# Patient Record
Sex: MALE | Race: WHITE | NOT HISPANIC OR LATINO | URBAN - METROPOLITAN AREA
[De-identification: names, ages, dates, MRNs, and addresses within clinical notes are randomized per-mention and may not be internally consistent; named-entity substitution may affect disease eponyms.]

---
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
## 2024-02-21 PROBLEM — H61.20 CERUMEN IMPACTION: Status: RESOLVED | Noted: 2019-12-19 | Resolved: 2024-02-21

## 2024-05-09 ENCOUNTER — OFFICE VISIT (OUTPATIENT)
Dept: FAMILY MEDICINE CLINIC | Facility: CLINIC | Age: 19
End: 2024-05-09
Payer: COMMERCIAL

## 2024-05-09 VITALS
TEMPERATURE: 98.6 F | HEIGHT: 70 IN | BODY MASS INDEX: 31.55 KG/M2 | HEART RATE: 76 BPM | OXYGEN SATURATION: 97 % | WEIGHT: 220.4 LBS | SYSTOLIC BLOOD PRESSURE: 122 MMHG | DIASTOLIC BLOOD PRESSURE: 98 MMHG

## 2024-05-09 DIAGNOSIS — Z00.00 ROUTINE HEALTH MAINTENANCE: ICD-10-CM

## 2024-05-09 DIAGNOSIS — L81.9 PIGMENTED SKIN LESION: Primary | ICD-10-CM

## 2024-05-09 LAB
ALBUMIN SERPL-MCNC: 4.8 G/DL (ref 3.5–5.7)
ALP SERPL-CCNC: 48 U/L (ref 35–120)
ALT SERPL-CCNC: 128 U/L
ANION GAP SERPL CALCULATED.3IONS-SCNC: 10 MMOL/L (ref 3–11)
AST SERPL-CCNC: 50 U/L
BASOPHILS # BLD AUTO: 0 THOU/CMM (ref 0–0.1)
BASOPHILS NFR BLD AUTO: 1 %
BILIRUB SERPL-MCNC: 0.7 MG/DL (ref 0.2–1)
BUN SERPL-MCNC: 13 MG/DL (ref 7–28)
CALCIUM SERPL-MCNC: 9.9 MG/DL (ref 8.5–10.1)
CHLORIDE SERPL-SCNC: 102 MMOL/L (ref 100–109)
CHOLEST SERPL-MCNC: 166 MG/DL
CHOLEST/HDLC SERPL: 3.4 {RATIO}
CO2 SERPL-SCNC: 27 MMOL/L (ref 21–31)
CREAT SERPL-MCNC: 0.86 MG/DL (ref 0.53–1.3)
CYTOLOGY CMNT CVX/VAG CYTO-IMP: ABNORMAL
DIFFERENTIAL METHOD BLD: NORMAL
EOSINOPHIL # BLD AUTO: 0.3 THOU/CMM (ref 0–0.5)
EOSINOPHIL NFR BLD AUTO: 5 %
ERYTHROCYTE [DISTWIDTH] IN BLOOD BY AUTOMATED COUNT: 12.4 % (ref 12–16)
GFR/BSA.PRED SERPLBLD CYS-BASED-ARV: 127 ML/MIN/{1.73_M2}
GLUCOSE SERPL-MCNC: 85 MG/DL (ref 65–99)
HCT VFR BLD AUTO: 44 % (ref 37–48)
HDLC SERPL-MCNC: 49 MG/DL (ref 23–92)
HGB BLD-MCNC: 15.9 G/DL (ref 12.5–17)
LDLC SERPL CALC-MCNC: 94 MG/DL
LYMPHOCYTES # BLD AUTO: 2.5 THOU/CMM (ref 1–3)
LYMPHOCYTES NFR BLD AUTO: 38 %
MCH RBC QN AUTO: 30.7 PG (ref 27–36)
MCHC RBC AUTO-ENTMCNC: 36 G/DL (ref 32–37)
MCV RBC AUTO: 85 FL (ref 80–100)
MONOCYTES # BLD AUTO: 0.5 THOU/CMM (ref 0.3–1)
MONOCYTES NFR BLD AUTO: 7 %
NEUTROPHILS # BLD AUTO: 3.2 THOU/CMM (ref 1.8–7.8)
NEUTROPHILS NFR BLD AUTO: 49 %
NONHDLC SERPL-MCNC: 117 MG/DL
PLATELET # BLD AUTO: 263 THOU/CMM (ref 140–350)
PMV BLD REES-ECKER: 8 FL (ref 7.5–11.3)
POTASSIUM SERPL-SCNC: 4.4 MMOL/L (ref 3.5–5.2)
PROT SERPL-MCNC: 7.4 G/DL (ref 6.3–8.3)
RBC # BLD AUTO: 5.17 MILL/CMM (ref 4–5.4)
SODIUM SERPL-SCNC: 139 MMOL/L (ref 135–145)
TRIGL SERPL-MCNC: 114 MG/DL
TSH SERPL-ACNC: 0.99 UIU/ML (ref 0.45–5.33)
WBC # BLD AUTO: 6.5 THOU/CMM (ref 4–10.5)

## 2024-05-09 PROCEDURE — 99213 OFFICE O/P EST LOW 20 MIN: CPT | Performed by: FAMILY MEDICINE

## 2024-05-09 PROCEDURE — 99395 PREV VISIT EST AGE 18-39: CPT | Performed by: FAMILY MEDICINE

## 2024-05-13 NOTE — PROGRESS NOTES
Assessment/Plan:    20 y/o male with: pigmented skin lesion and annual well visit. Will refer to Derm. Discussed supportive care and return parameters.     Regarding Annual well visit. Discussed various safety and health maintenance issues including healthy diet like the Mediterranean diet, exercise, ample sleep, stress reduction, and healthy weight as tolerated. Discussed supportive care and return parameters.     No problem-specific Assessment & Plan notes found for this encounter.       Diagnoses and all orders for this visit:    Pigmented skin lesion  -     Ambulatory Referral to Dermatology; Future  -     CBC and differential; Future  -     Comprehensive metabolic panel; Future  -     TSH, 3rd generation with Free T4 reflex; Future  -     Lipid Panel with Direct LDL reflex; Future    Routine health maintenance  -     CBC and differential; Future  -     Comprehensive metabolic panel; Future  -     TSH, 3rd generation with Free T4 reflex; Future  -     Lipid Panel with Direct LDL reflex; Future          Subjective:     Chief Complaint   Patient presents with    Well Check     Annual physical, patient is complaining of spot by the left ear which has been there for a few month. Patient would also like to have his lungs checked for fluid. No further concerns, ng        Patient ID: Dom Monzon is a 19 y.o. male.    Pt is a 20 y/o male who presents for follow-up on changing skin lesion left face no fevers chills nausea or vomiting. Pt also here for annual well visit admits being active eats and sleeps well.        The following portions of the patient's history were reviewed and updated as appropriate: allergies, current medications, past family history, past medical history, past social history, past surgical history and problem list.    Review of Systems   Constitutional: Negative.    HENT: Negative.     Eyes: Negative.    Respiratory: Negative.     Cardiovascular: Negative.    Gastrointestinal: Negative.   "  Endocrine: Negative.    Genitourinary: Negative.    Musculoskeletal: Negative.    Allergic/Immunologic: Negative.    Neurological: Negative.    Hematological: Negative.    Psychiatric/Behavioral: Negative.     All other systems reviewed and are negative.        Objective:      /98 (BP Location: Left arm, Patient Position: Sitting, Cuff Size: Large)   Pulse 76   Temp 98.6 °F (37 °C) (Tympanic)   Ht 5' 10\" (1.778 m)   Wt 100 kg (220 lb 6.4 oz)   SpO2 97%   BMI 31.62 kg/m²          Physical Exam  Constitutional:       Appearance: He is well-developed.   HENT:      Head: Atraumatic.      Right Ear: External ear normal.      Left Ear: External ear normal.   Eyes:      Conjunctiva/sclera: Conjunctivae normal.      Pupils: Pupils are equal, round, and reactive to light.   Cardiovascular:      Rate and Rhythm: Normal rate and regular rhythm.      Heart sounds: Normal heart sounds.   Pulmonary:      Effort: Pulmonary effort is normal. No respiratory distress.      Breath sounds: Normal breath sounds.   Abdominal:      General: Bowel sounds are normal. There is no distension.      Palpations: Abdomen is soft.      Tenderness: There is no abdominal tenderness. There is no guarding or rebound.   Musculoskeletal:         General: Normal range of motion.      Cervical back: Normal range of motion.   Skin:     General: Skin is warm and dry.      Comments: Changing pigmented skin lesion   Neurological:      Mental Status: He is alert and oriented to person, place, and time.      Cranial Nerves: No cranial nerve deficit.   Psychiatric:         Behavior: Behavior normal.         Thought Content: Thought content normal.         Judgment: Judgment normal.         "

## 2024-05-28 ENCOUNTER — TELEMEDICINE (OUTPATIENT)
Dept: FAMILY MEDICINE CLINIC | Facility: CLINIC | Age: 19
End: 2024-05-28
Payer: COMMERCIAL

## 2024-05-28 DIAGNOSIS — R74.01 TRANSAMINITIS: Primary | ICD-10-CM

## 2024-05-28 PROCEDURE — G2012 BRIEF CHECK IN BY MD/QHP: HCPCS | Performed by: FAMILY MEDICINE

## 2024-05-30 NOTE — PROGRESS NOTES
Virtual Brief Visit    This Visit is being completed by telephone. The Patient is located at Home and in the following state in which I hold an active license [place]    The patient was identified by name and date of birth. Dom Monzon was informed that this is a telemedicine visit and that the visit is being conducted through the Epic Embedded platform. He agrees to proceed..  My office door was closed. No one else was in the room.  He acknowledged consent and understanding of privacy and security of the video platform. The patient has agreed to participate and understands they can discontinue the visit at any time.    Patient is aware this is a billable service.     Assessment/Plan:    Problem List Items Addressed This Visit    None  Visit Diagnoses       Transaminitis    -  Primary    Relevant Orders    Ambulatory Referral to Gastroenterology          18 y/o male with: transaminiitis. Will refer to GI for further workup. Discussed supportive care and return parameters.     Recent Visits  Date Type Provider Dept   05/28/24 Telemedicine Andres Smith MD [facility]   Showing recent visits within past 7 days and meeting all other requirements  Future Appointments  No visits were found meeting these conditions.  Showing future appointments within next 150 days and meeting all other requirements     Patient is a 18 y/o woman who presents for follow-up on recent labs which show transaminitis no fevers chills nausea or vomiting.

## 2024-06-08 PROBLEM — Z00.00 ROUTINE HEALTH MAINTENANCE: Status: RESOLVED | Noted: 2020-12-15 | Resolved: 2024-06-08

## 2024-07-23 ENCOUNTER — OFFICE VISIT (OUTPATIENT)
Dept: GASTROENTEROLOGY | Facility: MEDICAL CENTER | Age: 19
End: 2024-07-23
Payer: COMMERCIAL

## 2024-07-23 VITALS
SYSTOLIC BLOOD PRESSURE: 141 MMHG | BODY MASS INDEX: 32.89 KG/M2 | WEIGHT: 229.2 LBS | TEMPERATURE: 98.1 F | DIASTOLIC BLOOD PRESSURE: 82 MMHG | HEART RATE: 76 BPM

## 2024-07-23 DIAGNOSIS — R74.01 TRANSAMINITIS: ICD-10-CM

## 2024-07-23 PROCEDURE — 99203 OFFICE O/P NEW LOW 30 MIN: CPT | Performed by: NURSE PRACTITIONER

## 2024-07-23 NOTE — PROGRESS NOTES
[facility] - Outpatient Consultation  Dom Monzon 19 y.o. male MRN: [id number]  Encounter: [id number]          ASSESSMENT AND PLAN:    Dom Monzon is a 19 y.o. male who presents with complaint of     1. Transaminitis    Was told approximately 2 years ago that he had elevated LFTs.  He was to have a full workup but never had it done.  Here today for follow-up.  Recent labs as noted below.  Has not had any imaging of his abdomen.  Denies any herbal supplements.  No risk factors for viral hepatitis.  Denies any family history of any liver disease.  He does smoke marijuana daily.  Reports occasional alcohol use.  Over the past 2 years his weight has fluctuated up and down approximately 10 to 15 pounds.  Denies any jaundice, abdominal pain, melena or hematochezia.  Will obtain a full liver serology workup and an ultrasound of the abdomen.    -Ultrasound of the abdomen  -CLAIR, AMA, ASMA, ceruloplasmin, alpha 1 antitrypsin, iron studies, celiac panel, hepatitis A, B and C serologies, LFTs, direct bilirubin, hemoglobin A1c  -Follow-up in office in 3 months or sooner if needed       ______________________________________________________________________    HPI:    Dom Monzon is a 19 y.o. male who presents with complaint of .  He has a past medical history of acne, folliculitis and general anxiety disorder with panic attacks.      Was told approximately 2 years ago that he had elevated LFTs.  He was to have a full workup but never had it done.  Here today for follow-up.  Recent labs as noted below.  Has not had any imaging of his abdomen.  Denies any herbal supplements.  No risk factors for viral hepatitis.  Denies any family history of any liver disease.  He does smoke marijuana daily.  Over the past 2 years his weight has fluctuated up and down approximately 10 to 15 pounds.    Labs-ALT ranging , AST as high as 50, normal alk phos, total bilirubin ranging normal to 1.4. CBC normal with  platelet 263 cholesterol 166, triglycerides 114, HDL 49, LDL 94, TSH 0.99    No recent abdominal imaging to review    REVIEW OF SYSTEMS:    CONSTITUTIONAL: Denies any fever, chills, rigors, and weight loss.  HEENT: No earache or tinnitus. Denies hearing loss or visual disturbances.  CARDIOVASCULAR: No chest pain or palpitations.   RESPIRATORY: Denies any cough, hemoptysis, shortness of breath or dyspnea on exertion.  GASTROINTESTINAL: As noted in the History of Present Illness.   GENITOURINARY: No problems with urination. Denies any hematuria or dysuria.  NEUROLOGIC: No dizziness or vertigo, denies headaches.   MUSCULOSKELETAL: Denies any muscle or joint pain.   SKIN: Denies skin rashes or itching.   ENDOCRINE: Denies excessive thirst. Denies intolerance to heat or cold.  PSYCHOSOCIAL: Denies depression or anxiety. Denies any recent memory loss.       Historical Information   Past Medical History:   Diagnosis Date   • Acne    • Allergic    • Anxiety    • Obesity      History reviewed. No pertinent surgical history.  Social History   Social History     Substance and Sexual Activity   Alcohol Use Never     Social History     Substance and Sexual Activity   Drug Use Never     Social History     Tobacco Use   Smoking Status Former   Smokeless Tobacco Never   Tobacco Comments    vap     Family History   Problem Relation Age of Onset   • No Known Problems Mother    • No Known Problems Father    • Hyperlipidemia Sister        Meds/Allergies       Current Outpatient Medications:   •  acetaminophen (TYLENOL) 325 mg tablet  •  albuterol (Ventolin HFA) 90 mcg/act inhaler  •  carbamide peroxide (DEBROX) 6.5 % otic solution  •  famotidine (PEPCID) 40 MG tablet  •  fluticasone (FLONASE) 50 mcg/act nasal spray  •  tretinoin (RETIN-A) 0.025 % cream  •  triamcinolone (KENALOG) 0.1 % cream    No Known Allergies        Objective     Blood pressure 141/82, pulse 76, temperature 98.1 °F (36.7 °C), weight 104 kg (229 lb 3.2 oz). Body  mass index is 32.89 kg/m².        PHYSICAL EXAM:      General Appearance:   Alert, cooperative, no distress   HEENT:   Normocephalic, atraumatic, anicteric.     Neck:  Supple, symmetrical, trachea midline   Lungs:   Clear to auscultation bilaterally; no rales, rhonchi or wheezing; respirations unlabored    Heart::   Regular rate and rhythm; no murmur, rub, or gallop.   Abdomen:   Soft, non-tender, non-distended; normal bowel sounds; no masses, no organomegaly    Genitalia:   Deferred    Rectal:   Deferred    Extremities:  No cyanosis, clubbing or edema    Pulses:  2+ and symmetric    Skin:  No jaundice, rashes, or lesions    Lymph nodes:  No palpable cervical lymphadenopathy        Lab Results:   No visits with results within 1 Day(s) from this visit.   Latest known visit with results is:   Orders Only on 05/09/2024   Component Date Value   • Hemoglobin 05/09/2024 15.9    • HCT 05/09/2024 44.0    • White Blood Cell Count 05/09/2024 6.5    • Red Blood Cell Count 05/09/2024 5.17    • Platelet Count 05/09/2024 263    • SL AMB MPV 05/09/2024 8.0    • MCV 05/09/2024 85    • MCH 05/09/2024 30.7    • MCHC 05/09/2024 36.0    • RDW 05/09/2024 12.4    • Differential Type 05/09/2024 AUTO    • Neutrophils (Absolute) 05/09/2024 3.2    • Lymphocytes (Absolute) 05/09/2024 2.5    • Monocytes (Absolute) 05/09/2024 0.5    • Eosinophils (Absolute) 05/09/2024 0.3    • Basophils ABS 05/09/2024 0.0    • Neutrophils 05/09/2024 49    • Lymphocytes 05/09/2024 38    • Monocytes 05/09/2024 7    • Eosinophils 05/09/2024 5    • Basophils PCT 05/09/2024 1    • Glucose, Random 05/09/2024 85    • BUN 05/09/2024 13    • Creatinine 05/09/2024 0.86    • Sodium 05/09/2024 139    • Potassium 05/09/2024 4.4    • Chloride 05/09/2024 102    • CO2 05/09/2024 27    • Calcium 05/09/2024 9.9    • Alkaline Phosphatase 05/09/2024 48    • Albumin 05/09/2024 4.8    • TOTAL BILIRUBIN 05/09/2024 0.7    • Protein, Total 05/09/2024 7.4    • AST 05/09/2024 50 (H)    •  "ALT 05/09/2024 128 (H)    • ANION GAP 05/09/2024 10    • eGFR 05/09/2024 127    • Comment 05/09/2024 (Note)    • Cholesterol, Total 05/09/2024 166    • Triglycerides 05/09/2024 114    • HDL Cholesterol 05/09/2024 49    • Non HDL Chol. (LDL+VLDL) 05/09/2024 117    • Chol HDLC Ratio 05/09/2024 3.4    • LDL Calculated 05/09/2024 94    • TSH 05/09/2024 0.99        Lab Results   Component Value Date    WBC 6.5 05/09/2024    HGB 15.9 05/09/2024    HCT 44.0 05/09/2024    MCV 85 05/09/2024     05/09/2024       Lab Results   Component Value Date    SODIUM 139 05/09/2024    K 4.4 05/09/2024     05/09/2024    CO2 27 05/09/2024    AGAP 10 05/09/2024    BUN 13 05/09/2024    CREATININE 0.86 05/09/2024    GLUC 85 05/09/2024    CALCIUM 9.9 05/09/2024    AST 50 (H) 05/09/2024     (H) 05/09/2024    ALKPHOS 48 05/09/2024    TP 7.4 05/09/2024    TBILI 0.7 05/09/2024    EGFR 127 05/09/2024       No results found for: \"CRP\"    Lab Results   Component Value Date    TSH 0.99 05/09/2024       No results found for: \"IRON\", \"TIBC\", \"FERRITIN\"    Radiology Results:   No results found.  "

## 2024-07-30 LAB
ALBUMIN SERPL-MCNC: 4.9 G/DL (ref 3.5–5.7)
ALP SERPL-CCNC: 54 U/L (ref 35–120)
ALT SERPL-CCNC: 108 U/L
AST SERPL-CCNC: 36 U/L
BILIRUB DIRECT SERPL-MCNC: 0.1 MG/DL (ref 0–0.2)
BILIRUB SERPL-MCNC: 0.7 MG/DL (ref 0.2–1)
EST. AVERAGE GLUCOSE BLD GHB EST-MCNC: 94 MG/DL
FERRITIN SERPL-MCNC: 136 NG/ML (ref 23.9–336.2)
HAV AB SER-IMP: ABNORMAL
HAV IGG+IGM SER QL: NONREACTIVE
HAV IGM SERPL QL IA: NONREACTIVE
HBA1C MFR BLD: 4.9 %
HBV CORE AB SERPL QL IA: NONREACTIVE
HBV CORE IGM SERPL QL IA: NONREACTIVE
HBV SURFACE AB SERPL IA-ACNC: 8 MIU/ML
HBV SURFACE AG SERPL QL IA: NONREACTIVE
HCV AB SERPL QL IA: NONREACTIVE
IGA SERPL-MCNC: 150 MG/DL (ref 71–365)
IRON SATN MFR SERPL: 21 % (ref 20–50)
IRON SERPL-MCNC: 94 UG/DL (ref 50–212)
PROT SERPL-MCNC: 7.7 G/DL (ref 6.3–8.3)
REF LAB TEST REF RANGE: ABNORMAL
TIBC SERPL-MCNC: 442 UG/DL (ref 260–430)
TRANSFERRIN SERPL-MCNC: 316 MG/DL (ref 203–362)

## 2024-07-31 LAB
A1AT SERPL-MCNC: 112 MG/DL (ref 92–200)
ANA HOMOGEN TITR SER: 80 TITER
ANA SER QL IF: PRESENT
CERULOPLASMIN SERPL-MCNC: 25.9 MG/DL (ref 22–61)
TTG IGA SER-ACNC: <3 U/ML

## 2024-08-01 LAB
DSDNA IGG SERPL IA-ACNC: NEGATIVE TITER
ENA SCL70 AB SER-ACNC: NEGATIVE
ENA SM+RNP AB SER-ACNC: NEGATIVE
ENA SS-A AB SER-ACNC: <20 ELISA UNIT
ENA SS-B AB SER-ACNC: <20 ELISA UNIT
MITOCHONDRIA AB TITR SER IF: NEGATIVE TITER
SL AMB SMITH ANTIBODIES: NEGATIVE
SMOOTH MUSCLE AB TITR SER IF: 20 TITER

## 2024-08-05 DIAGNOSIS — R79.89 ELEVATED LFTS: Primary | ICD-10-CM

## 2024-08-06 ENCOUNTER — TELEPHONE (OUTPATIENT)
Dept: GASTROENTEROLOGY | Facility: MEDICAL CENTER | Age: 19
End: 2024-08-06

## 2024-08-06 NOTE — TELEPHONE ENCOUNTER
Result report shows seen by patient Dom Monzon on 8/5/2024 10:12 AM. Called pt and left a VM indicating I was reaching out to confirm he viewed his result and understood that she put in an order for some additional lab work that is recommended. Provided office callback number for any questions or concerns.     ----- Message from ELIAZAR La sent at 8/5/2024  8:07 AM EDT -----  I just reviewed the results of your lab test.  Your CLAIR is slightly elevated as well as your smooth muscle antibody.  The levels are very low.  This can be elevated with autoimmune hepatitis but I like to do some other blood testing to see if this is possible.  Just because these blood test are slightly elevated does not mean you have autoimmune hepatitis.  I will let you know the results of the follow-up blood work.

## 2024-08-07 ENCOUNTER — HOSPITAL ENCOUNTER (OUTPATIENT)
Dept: ULTRASOUND IMAGING | Facility: HOSPITAL | Age: 19
Discharge: HOME | End: 2024-08-07
Payer: COMMERCIAL

## 2024-08-07 ENCOUNTER — APPOINTMENT (OUTPATIENT)
Dept: LAB | Facility: HOSPITAL | Age: 19
End: 2024-08-07
Payer: COMMERCIAL

## 2024-08-07 DIAGNOSIS — R79.89 ELEVATED LFTS: ICD-10-CM

## 2024-08-07 DIAGNOSIS — R74.01 TRANSAMINITIS: ICD-10-CM

## 2024-08-07 LAB
IGA SERPL-MCNC: 162 MG/DL (ref 66–433)
IGG SERPL-MCNC: 1127 MG/DL (ref 635–1741)
IGM SERPL-MCNC: 139 MG/DL (ref 45–281)

## 2024-08-07 PROCEDURE — 82784 ASSAY IGA/IGD/IGG/IGM EACH: CPT

## 2024-08-07 PROCEDURE — 36415 COLL VENOUS BLD VENIPUNCTURE: CPT

## 2024-08-07 PROCEDURE — 76700 US EXAM ABDOM COMPLETE: CPT

## 2024-08-07 PROCEDURE — 86376 MICROSOMAL ANTIBODY EACH: CPT

## 2024-08-08 DIAGNOSIS — R16.2 HEPATOSPLENOMEGALY: ICD-10-CM

## 2024-08-08 DIAGNOSIS — K76.0 FATTY LIVER: Primary | ICD-10-CM

## 2024-08-08 LAB — LKM-1 AB SER-ACNC: <20.1 UNITS (ref 0–20)

## 2024-08-21 ENCOUNTER — HOSPITAL ENCOUNTER (OUTPATIENT)
Dept: ULTRASOUND IMAGING | Facility: MEDICAL CENTER | Age: 19
Discharge: HOME | End: 2024-08-21
Payer: COMMERCIAL

## 2024-08-21 DIAGNOSIS — R16.2 HEPATOSPLENOMEGALY: ICD-10-CM

## 2024-08-21 DIAGNOSIS — K76.0 FATTY LIVER: ICD-10-CM

## 2024-08-21 PROCEDURE — 76981 USE PARENCHYMA: CPT

## 2024-09-13 ENCOUNTER — OFFICE VISIT (OUTPATIENT)
Dept: URGENT CARE | Facility: MEDICAL CENTER | Age: 19
End: 2024-09-13
Payer: COMMERCIAL

## 2024-09-13 VITALS
OXYGEN SATURATION: 96 % | BODY MASS INDEX: 31.78 KG/M2 | RESPIRATION RATE: 18 BRPM | HEIGHT: 69 IN | DIASTOLIC BLOOD PRESSURE: 94 MMHG | WEIGHT: 214.6 LBS | TEMPERATURE: 99.6 F | SYSTOLIC BLOOD PRESSURE: 150 MMHG | HEART RATE: 78 BPM

## 2024-09-13 DIAGNOSIS — L03.032 CELLULITIS OF TOE OF LEFT FOOT: ICD-10-CM

## 2024-09-13 DIAGNOSIS — L03.032 PARONYCHIA OF GREAT TOE, LEFT: Primary | ICD-10-CM

## 2024-09-13 PROCEDURE — G0382 LEV 3 HOSP TYPE B ED VISIT: HCPCS

## 2024-09-13 RX ORDER — CEPHALEXIN 500 MG/1
500 CAPSULE ORAL EVERY 8 HOURS SCHEDULED
Qty: 21 CAPSULE | Refills: 0 | Status: SHIPPED | OUTPATIENT
Start: 2024-09-13 | End: 2024-09-20

## 2024-09-13 NOTE — PATIENT INSTRUCTIONS
"Prescribed course of Keflex, take as directed.  Recommend warm soaks (can add Epsom salt) or compresses to the area 4 times daily for 15 minute periods.  Over the counter pain medication as directed on packaging as needed for pain (may alternate Tylenol and ibuprofen (Advil) every 3 hours).  Rest, elevation can be helpful.    Follow up with PCP in 3-5 days.  Proceed to  ER if symptoms worsen.    If tests are performed, our office will contact you with results only if changes need to made to the care plan discussed with you at the visit. You can review your full results on [facility] My[facility].    Patient Education     Paronychia   The Basics   Written by the doctors and editors at [facility]   What is paronychia? -- Paronychia is a skin infection that happens around the fingernails or toenails (picture 1).  You are more likely to get to get this infection if you:   Push down or trim the skin at the base of the nail (called the \"cuticle\")   Bite your nails   Suck your thumb or finger  People who have jobs that make them keep their hands in water a lot are also more likely to get paronychia.  What are the symptoms of paronychia? -- Symptoms include:   A painful, swollen area around the nail   Pus-filled blisters near the nail  Is there a test for paronychia? -- No. But your doctor or nurse should be able to tell if you have it by learning about your symptoms and doing an exam.  Is there anything I can do on my own to feel better? -- Yes. Some people feel better if they:   Soak the affected finger or toe in warm water for 20 minutes, 3 times a day.   Put triple antibiotic ointment (sample brand names: Neosporin, Triple Antibiotic) on the infected area after soaking it.  How is paronychia treated? -- If the treatments you tried on your own don't help, your doctor might give you antibiotics to treat the infection.  If you have a pus-filled blister, they might give you a shot to numb your finger or toe and then use a needle " or sharp tool to open and drain the blister. After, you will need to soak your finger or toe and take antibiotics.  Your doctor might also prescribe other medicines, such as steroids or anti-fungal medicines.  Can paronychia be prevented? -- You can reduce your chances of getting paronychia if you:   Push your cuticles down gently. Do not trim or cut them.   Wear rubber gloves if you need to put your hands in water.   Avoid biting your nails.   Keep your fingers out of your mouth.  When should I call the doctor? -- Call for advice if:   You have a fever of 100.4°F (38°C) or higher, or chills.   You have more drainage, redness, swelling, warmth, or pain around your nail.  All topics are updated as new evidence becomes available and our peer review process is complete.  This topic retrieved from [employer] on: Feb 26, 2024.  Topic 71174 Version 7.0  Release: 32.2.4 - C32.56  © 2024 UpToDate, Inc. and/or its affiliates. All rights reserved.  picture 1: Paronychia     Paronychia is a skin infection that causes a painful, swollen area around a fingernail or toenail. Some people also get pus-filled blisters, as shown in this photo.  Graphic 73243 Version 6.0  Consumer Information Use and Disclaimer   Disclaimer: This generalized information is a limited summary of diagnosis, treatment, and/or medication information. It is not meant to be comprehensive and should be used as a tool to help the user understand and/or assess potential diagnostic and treatment options. It does NOT include all information about conditions, treatments, medications, side effects, or risks that may apply to a specific patient. It is not intended to be medical advice or a substitute for the medical advice, diagnosis, or treatment of a health care provider based on the health care provider's examination and assessment of a patient's specific and unique circumstances. Patients must speak with a health care provider for complete information about their  health, medical questions, and treatment options, including any risks or benefits regarding use of medications. This information does not endorse any treatments or medications as safe, effective, or approved for treating a specific patient. UpToDate, Inc. and its affiliates disclaim any warranty or liability relating to this information or the use thereof.The use of this information is governed by the Terms of Use, available at https://www.[facility].com/en/know/clinical-effectiveness-terms. 2024© UpToDate, Inc. and its affiliates and/or licensors. All rights reserved.  Copyright   © 2024 UpToDate, Inc. and/or its affiliates. All rights reserved.

## 2024-09-13 NOTE — PROGRESS NOTES
[facility]        NAME: Dom Monzon is a 19 y.o. male  : 2005    MRN: [id number]  DATE: 2024  TIME: 2:19 PM    Assessment and Plan   Paronychia of great toe, left [L03.032]  1. Paronychia of great toe, left  cephalexin (KEFLEX) 500 mg capsule    Ambulatory Referral to Podiatry      2. Cellulitis of toe of left foot  cephalexin (KEFLEX) 500 mg capsule    Ambulatory Referral to Podiatry        Presentation consistent with paronychia versus ingrown toenail causing localized cellulitis. Prescribed course of Keflex. Advised symptomatic treatment. Referral to podiatry placed for follow up.    Patient Instructions     Prescribed course of Keflex, take as directed.  Recommend warm soaks (can add Epsom salt) or compresses to the area 4 times daily for 15 minute periods.  Over the counter pain medication as directed on packaging as needed for pain (may alternate Tylenol and ibuprofen (Advil) every 3 hours).  Rest, elevation can be helpful.    Follow up with PCP in 3-5 days.  Proceed to  ER if symptoms worsen.    If tests are performed, our office will contact you with results only if changes need to made to the care plan discussed with you at the visit. You can review your full results on [facility].    Chief Complaint     Chief Complaint   Patient presents with    left big toe pain     Pt states he was cutting his toe nails 5 days ago.  He cut his left 1st toe nail too short at that pain.  At that time the area burned slightly but now the area is reddened, swollen and warm to touch.  Tan drainage is present.           History of Present Illness       Patient presents with pain to the left big toe. Notes about 5 days ago he was trimming his nail and started to feel a slight pain. Over the past few days he has had worsening pain, swelling, and redness.  Currently his pain is an 8/10, but can go up to a 10/10 with putting weight on the area. He has observed drainage of pus from the  area. He notes slight tingling to the area. Denies loss of sensation or movement. For treatment patient has tried Neosporin, hydrogen peroxide, Epsom salt soaks, betadine with no significant relief. He has been taking Advil, which seems to help somewhat with the pain.        Review of Systems   Review of Systems   Constitutional:  Negative for chills and fever.   Musculoskeletal:  Positive for joint swelling. Negative for myalgias.   Skin:  Positive for color change.         Current Medications       Current Outpatient Medications:     cephalexin (KEFLEX) 500 mg capsule, Take 1 capsule (500 mg total) by mouth every 8 (eight) hours for 7 days, Disp: 21 capsule, Rfl: 0    acetaminophen (TYLENOL) 325 mg tablet, Take 650 mg by mouth every 6 (six) hours as needed for mild pain or moderate pain (Back pain) (Patient not taking: Reported on 4/27/2022), Disp: , Rfl:     albuterol (Ventolin HFA) 90 mcg/act inhaler, Inhale 2 puffs every 6 (six) hours as needed for wheezing (Patient not taking: Reported on 3/30/2023), Disp: 18 g, Rfl: 0    carbamide peroxide (DEBROX) 6.5 % otic solution, Administer 5 drops into the left ear 2 (two) times a day (Patient not taking: Reported on 12/31/2020), Disp: 15 mL, Rfl: 0    famotidine (PEPCID) 40 MG tablet, Take 1 tablet (40 mg total) by mouth daily (Patient not taking: Reported on 12/31/2020), Disp: 30 tablet, Rfl: 0    fluticasone (FLONASE) 50 mcg/act nasal spray, 2 sprays into each nostril daily (Patient not taking: Reported on 12/31/2020), Disp: 1 Bottle, Rfl: 0    tretinoin (RETIN-A) 0.025 % cream, Apply topically daily at bedtime (Patient not taking: Reported on 1/26/2021), Disp: 45 g, Rfl: 0    triamcinolone (KENALOG) 0.1 % cream, Apply topically 2 (two) times a day (Patient not taking: Reported on 12/31/2020), Disp: 30 g, Rfl: 0    Current Allergies     Allergies as of 09/13/2024    (No Known Allergies)            The following portions of the patient's history were reviewed and  "updated as appropriate: allergies, current medications, past family history, past medical history, past social history, past surgical history and problem list.     Past Medical History:   Diagnosis Date    Acne     Allergic     Anxiety     Obesity        History reviewed. No pertinent surgical history.    Family History   Problem Relation Age of Onset    No Known Problems Mother     No Known Problems Father     Hyperlipidemia Sister          Medications have been verified.        Objective   /94 (BP Location: Left arm, Patient Position: Sitting)   Pulse 78   Temp 99.6 °F (37.6 °C) (Tympanic)   Resp 18   Ht 5' 9\" (1.753 m)   Wt 97.3 kg (214 lb 9.6 oz)   SpO2 96%   BMI 31.69 kg/m²        Physical Exam     Physical Exam  Vitals and nursing note reviewed.   Constitutional:       General: He is not in acute distress.     Appearance: Normal appearance. He is not ill-appearing.   Cardiovascular:      Rate and Rhythm: Normal rate and regular rhythm.      Pulses: Normal pulses.      Heart sounds: Normal heart sounds.   Pulmonary:      Effort: Pulmonary effort is normal.      Breath sounds: Normal breath sounds.   Feet:      Comments: L Great Toe: Paronychia of lateral aspect of nail. Actively draining purulent fluid. Surrounding erythema extending down toe proximally. ROM intact. Area TTP. NVI distally.  Neurological:      Mental Status: He is alert.                   "

## 2024-11-27 ENCOUNTER — OFFICE VISIT (OUTPATIENT)
Dept: GASTROENTEROLOGY | Facility: MEDICAL CENTER | Age: 19
End: 2024-11-27
Payer: COMMERCIAL

## 2024-11-27 VITALS
WEIGHT: 206.4 LBS | TEMPERATURE: 98.6 F | BODY MASS INDEX: 29.55 KG/M2 | SYSTOLIC BLOOD PRESSURE: 144 MMHG | OXYGEN SATURATION: 98 % | HEIGHT: 70 IN | HEART RATE: 97 BPM | DIASTOLIC BLOOD PRESSURE: 88 MMHG

## 2024-11-27 DIAGNOSIS — K76.0 FATTY LIVER: Primary | ICD-10-CM

## 2024-11-27 DIAGNOSIS — R16.2 HEPATOSPLENOMEGALY: ICD-10-CM

## 2024-11-27 DIAGNOSIS — R79.89 ELEVATED LFTS: ICD-10-CM

## 2024-11-27 DIAGNOSIS — R74.01 TRANSAMINITIS: ICD-10-CM

## 2024-11-27 PROCEDURE — 99213 OFFICE O/P EST LOW 20 MIN: CPT | Performed by: NURSE PRACTITIONER

## 2024-11-27 NOTE — PROGRESS NOTES
Name: Dom Monzon      : 2005      MRN: [id number]  Encounter Provider: ELIAZAR Loja  Encounter Date: 2024   Encounter department: [facility]  :  Assessment & Plan  Fatty liver  Liver serology workup negative other than slight elevation in CLAIR but anti-LK M and serum immunoglobulins were normal.  Ultrasound the abdomen noted hepatic steatosis and hepatosplenomegaly.  Ultrasound elastography noted S3, F0-F1.  Has not had any recent LFTs.  We did discuss fatty liver disease process, treatment and disease complications.    -Low-fat/low-cholesterol diet  -Weight reduction of 7 to 10% of body weight  -LFTs every 6 months  -Follow-up in office in 6 months or sooner if needed       Transaminitis  Refer above           History of Present Illness     HPI  Dom Monzon is a 19 y.o. male who presents for follow-up.  He was was last seen by myself  for transaminitis.    Was told approximately 2 years ago that he had elevated LFTs.  No risk factors for viral hepatitis.  Denies any herbal supplements.  Denies any family history of any liver disease.  He does smoke marijuana daily.  Reports occasional alcohol use.  Over the past 2 years his weight has fluctuated up and down approximately 10 to 15 pounds.  Denies any jaundice, abdominal pain, melena hematochezia.  Ultrasound of the abdomen noted hepatosplenomegaly and marked diffuse hepatic steatosis.  Labs -AST 36, , alk phos 54, total bilirubin 0.7, cholesterol 166, triglycerides 114, HDL 49, , iron 94, ferritin 136, iron sat 21, TIBC is 442, ceruloplasmin normal, hemoglobin A1c 4.9, alpha-1 antitrypsin normal, celiac panel negative, anti-LK M neg, hepatitis B surface antibody 8, hepatitis B core antibodies negative, hepatitis C antibodies negative, hepatitis B surface antigen negative, CLAIR 80, IgA 162, IgG 1127, IgM 139, AMA negative, smooth muscle 20.    # Recent ultrasound  elastography noted S3, F0-F1.        History obtained from: patient    Review of Systems   All other systems reviewed and are negative.    Medical History Reviewed by provider this encounter:  Tobacco  Allergies  Meds  Problems  Med Hx  Surg Hx  Fam Hx     .  Current Outpatient Medications on File Prior to Visit   Medication Sig Dispense Refill    acetaminophen (TYLENOL) 325 mg tablet Take 650 mg by mouth every 6 (six) hours as needed for mild pain or moderate pain (Back pain) (Patient not taking: Reported on 4/27/2022)      albuterol (Ventolin HFA) 90 mcg/act inhaler Inhale 2 puffs every 6 (six) hours as needed for wheezing (Patient not taking: Reported on 3/30/2023) 18 g 0    carbamide peroxide (DEBROX) 6.5 % otic solution Administer 5 drops into the left ear 2 (two) times a day (Patient not taking: Reported on 12/31/2020) 15 mL 0    famotidine (PEPCID) 40 MG tablet Take 1 tablet (40 mg total) by mouth daily (Patient not taking: Reported on 12/31/2020) 30 tablet 0    fluticasone (FLONASE) 50 mcg/act nasal spray 2 sprays into each nostril daily (Patient not taking: Reported on 12/31/2020) 1 Bottle 0    tretinoin (RETIN-A) 0.025 % cream Apply topically daily at bedtime (Patient not taking: Reported on 1/26/2021) 45 g 0    triamcinolone (KENALOG) 0.1 % cream Apply topically 2 (two) times a day (Patient not taking: Reported on 12/31/2020) 30 g 0     No current facility-administered medications on file prior to visit.      Social History     Tobacco Use    Smoking status: Former    Smokeless tobacco: Never    Tobacco comments:     vap   Vaping Use    Vaping status: Every Day    Substances: Nicotine   Substance and Sexual Activity    Alcohol use: Never    Drug use: Never    Sexual activity: Not Currently        Objective   There were no vitals taken for this visit.     Physical Exam  Abdominal:      General: Bowel sounds are normal.      Palpations: Abdomen is soft.   Neurological:      Mental Status: He is  oriented to person, place, and time.

## 2025-05-12 ENCOUNTER — CONSULT (OUTPATIENT)
Dept: DERMATOLOGY | Facility: CLINIC | Age: 20
End: 2025-05-12
Payer: COMMERCIAL

## 2025-05-12 VITALS — TEMPERATURE: 98 F

## 2025-05-12 DIAGNOSIS — L70.0 ACNE VULGARIS: Primary | ICD-10-CM

## 2025-05-12 PROCEDURE — 99204 OFFICE O/P NEW MOD 45 MIN: CPT | Performed by: STUDENT IN AN ORGANIZED HEALTH CARE EDUCATION/TRAINING PROGRAM

## 2025-05-12 NOTE — PROGRESS NOTES
"[facility] Dermatology Clinic Note     Patient Name: Dom Monzon  Encounter Date: 5/12/25       Have you been cared for by a [facility] Dermatologist in the last 3 years and, if so, which description applies to you? NO. I am considered a \"new\" patient and must complete all patient intake questions. I am not of child-bearing potential.     REVIEW OF SYSTEMS:  Have you recently had or currently have any of the following? Recent fever or chills? No  Any non-healing wound? No   PAST MEDICAL HISTORY:  Have you personally ever had or currently have any of the following?  If \"YES,\" then please provide more detail. Skin cancer (such as Melanoma, Basal Cell Carcinoma, Squamous Cell Carcinoma?  No  Tuberculosis, HIV/AIDS, Hepatitis B or C: No  Radiation Treatment No   HISTORY OF IMMUNOSUPPRESSION:   Do you have a history of any of the following:  Systemic Immunosuppression such as Diabetes, Biologic or Immunotherapy, Chemotherapy, Organ Transplantation, Bone Marrow Transplantation or Prednisone?  No     Answering \"YES\" requires the addition of the dotphrase \"IMMUNOSUPPRESSED\" as the first diagnosis of the patient's visit.   FAMILY HISTORY:  Any \"first degree relatives\" (parent, brother, sister, or child) with the following?    Skin Cancer, Pancreatic or Other Cancer? No   PATIENT EXPERIENCE:    Do you want the Dermatologist to perform a COMPLETE skin exam today including a clinical examination under the \"bra and underwear\" areas?  NO  If necessary, do we have your permission to call and leave a detailed message on your Preferred Phone number that includes your specific medical information?  Yes      Allergies   Allergen Reactions   • Pollen Extract Sneezing      Current Outpatient Medications:   •  Adapalene-Benzoyl Peroxide 0.3-2.5 % GEL, Apply a pea-sized amount to the face at bedtime and spread evenly throughout., Disp: 60 g, Rfl: 6  •  acetaminophen (TYLENOL) 325 mg tablet, Take 650 mg by mouth every 6 (six) hours as " needed for mild pain or moderate pain (Back pain) (Patient not taking: Reported on 4/27/2022), Disp: , Rfl:   •  albuterol (Ventolin HFA) 90 mcg/act inhaler, Inhale 2 puffs every 6 (six) hours as needed for wheezing (Patient not taking: Reported on 3/30/2023), Disp: 18 g, Rfl: 0  •  carbamide peroxide (DEBROX) 6.5 % otic solution, Administer 5 drops into the left ear 2 (two) times a day (Patient not taking: Reported on 12/31/2020), Disp: 15 mL, Rfl: 0  •  famotidine (PEPCID) 40 MG tablet, Take 1 tablet (40 mg total) by mouth daily (Patient not taking: Reported on 12/31/2020), Disp: 30 tablet, Rfl: 0  •  fluticasone (FLONASE) 50 mcg/act nasal spray, 2 sprays into each nostril daily (Patient not taking: Reported on 12/31/2020), Disp: 1 Bottle, Rfl: 0  •  tretinoin (RETIN-A) 0.025 % cream, Apply topically daily at bedtime (Patient not taking: Reported on 1/26/2021), Disp: 45 g, Rfl: 0  •  triamcinolone (KENALOG) 0.1 % cream, Apply topically 2 (two) times a day (Patient not taking: Reported on 12/31/2020), Disp: 30 g, Rfl: 0              Whom besides the patient is providing clinical information about today's encounter?   NO ADDITIONAL HISTORIAN (patient alone provided history)    Physical Exam and Assessment/Plan by Diagnosis:    EPIDERMAL INCLUSION CYST    Physical Exam:  Anatomic Location Affected:  left temple  Morphological Description:  cyst  Pertinent Positives:  Pertinent Negatives:    Additional History of Present Condition:  pt noticed a spot on his temple that has been there for a while. He reports that it has never popped before or drained     Assessment and Plan:  Based on a thorough discussion of this condition and the management approach to it (including a comprehensive discussion of the known risks, side effects and potential benefits of treatment), the patient (family) agrees to implement the following specific plan:  Discussed removal. Deferred at this time.     What are epidermal inclusion  cysts?  Epidermal inclusion cysts are the most common, benign cutaneous cysts. There are many different names for epidermal inclusion cysts, including epidermoid cyst, epidermal cyst, infundibular cyst, inclusion cyst, and keratin cyst. These cysts can occur anywhere on the body and typically present as nodules directly underneath the skin. There is often a visible pore or opening in the center. The cysts are freely moveable and can range from a few millimeters to several centimeters in diameter. The center of epidermoid cysts almost always contains keratin, which has a cheesy appearance, and not sebum. They also do not originate from sebaceous glands. Therefore, epidermal inclusion cysts are not the same as sebaceous cysts.    Cysts may remain stable or progressively enlarge over time. There are no reliable predictive factors to tell if an epidermal inclusion cyst will enlarge, become inflamed, or remain quiescent. Infected cysts tend to become larger, turn red, and are more noticeable to the patient. There may be accompanying pain and discomfort.     What causes epidermal inclusion cysts?  Epidermal inclusion cysts often appear out of the blue and are not contagious. They are due to a proliferation of epidermal cells within the dermis and are more common in men than women. They occur more frequently in patients in their 20s to 40s. Epidermal inclusion cysts by themselves are usually not inherited, but they can be hereditary in rare syndromes such as Franz syndrome, nodular elastosis with cysts and comedones (Favre-Racouchot syndrome), and basal cell nevus syndrome (Gorlin syndrome). Elderly patients with chronic sun-damaged skin areas have a higher likelihood of developing epidermoid cysts. They often occur in areas where hair follicles have been inflamed or repeatedly irritated are more frequent in patients with acne vulgaris. In the  period, they are called milia.     Patients on BRAF inhibitors such  as imiquimod and cyclosporine have a higher incidence of epidermoid cysts of the face.    How do we diagnose an epidermal inclusion cyst?  Epidermoid inclusion cysts are often diagnosed by history and physical exam. There is usually no need for biopsy prior to removal.  Radiographic and laboratory exams, such as ultrasound studies, are unnecessary and not typically ordered unless the practitioner suspects a genetic condition.    What is the treatment for an epidermal inclusion cyst?  Inflamed, uninfected epidermal inclusion cysts rarely resolve spontaneously without therapy or surgical intervention. Treatment is not emergent unless desired by the patient.     Definitive treatment is via surgical excision with walls intact. This method will prevent recurrence. This is best done when the cyst is not inflamed, to decrease the probability of rupture during surgery.   A local anesthetic will be injected around the cyst  A small incision is made in the skin overlying the cyst, and contents are expressed  The incision is repaired with sutures    Another option is to use a 4mm punch biopsy with cyst extraction through the defect.    Incision and drainage is often needed if the cyst is infected or inflamed. If there is surrounding cellulitis, oral antibiotic therapy may be necessary. The common agents used target methicillin sensitive Staphylococcal aureus and methicillin resistant S aureus in areas of high prevalence.     ANDROGENETIC ALOPECIA    Physical Exam:    Well appearing, in no acute distress. Well nourishes, well developed. Alert and oriented. A focused skin exam was performed including scalp and hair. The exam was negative except as noted below:     Scalp:   Thinning over the vertex scalp and frontal scalp  Negative hair pull test  No perifollicular erythema or scale  Follicular orifices in tact  Hairline:  Eyebrows and Eyelashes In tact  Pertinent Positives:  Pertinent Negatives:      MALE PATIENT Assessment and  Plan:  History and physical consistent with androgenetic alopecia  Discussed treatment ladder, including PO and topical minoxidil, PRP,  low level laser therapy, supplements (saw palmetto - discussed same side effects as finasteride), finasteride and dutasteride, hair transplant  - Discussed 5% minoxidil foam qHS. Counseled patient that they may see some increased shedding within the first few weeks of treatment, which is a normal side effect and will resolve spontaneously. They may also see mild facial hypertrichosis. Educated that treatment should be continuous to maintain efficacy  - Discussed PO finasteride 1 mg daily - discussed that persistent sexual dysfunction has been seen in ~1% of men who take finasteride for hair loss. Also counseled patient that finasteride can lead to a falsely low PSA reading, and small potential increased risk of high grade prostate malignancy. Discussed that combination of topical minoxidil and oral finasteride is considered the best combination treatment for male androgenetic alopecia.   - Discussed  topical finasteride/minoxidil. Educated that topical formulations of finasteride are also available and have been shown to be as effective as 1 mg PO finasteride daily in the literature (PMID: 10380326), with decreased likelihood of side effects and possible synergistic effects when used in combination with topical minoxidil. Discussed [facility] compounded medication with Finasteride/Minoxidil for $35.  After discussion, patient is not interested in using finasteride at this time  *Discussed PO minoxidil 2.5 mg QOD which has been shown to improve hair growth at low dose. Educated on side effects, including hypertrichosis, hypotension, lower extremity edema.  Discussed additional options of light laser therapy; recommended hairmax brand laser band (can be purchased for about $800), which is thought to increase delivery of nutrients and restore the natural hair growth cycle,  "without notable side effects. Educated that studies were largely in patients who had active hair loss within the past 12 mos. Further discussed PRP therapy, educating that a recent study demonstrated that approximately 71% of patients with androgenetic alopecia respond to PRP treatments and that treatment consists of monthly PRP injections for 6 months followed by maintenance treatments every 3-6 months.   Educated patient that regrowth may take many months.      - Patient to start over-the-counter Rogaine (5% minoxidil) once in the morning and once at night  - Will see patient back in 6 mos to assess progress    ACNE VULGARIS (\"COMMON ACNE\")    Physical Exam:  Anatomic Location Affected:  face  Morphological Description:  Open/Closed Comedones:  Few (\"Mild\")  Inflammatory Papules/Pustules:  Rare (\"Almost Clear\")  Nodules:  Rare (\"Almost Clear\")  Scarring:  Rare (\"Almost Clear\")  Excoriations:  No evidence (\"Clear\")  Local Skin Redness/Erythema:  No evidence (\"Clear\")  Local Skin Dryness/Scaling:  No evidence (\"Clear\")  Local Skin Dyspigmentation:  No evidence (\"Clear\")      Additional History of Present Condition:  pt has been experiencing acne for years now and wants to do something to get rid of the black heads and white heads    Treatment plan:Based on a thorough discussion of this condition and the management approach to it (including a comprehensive discussion of the known risks, side effects and potential benefits of treatment), the patient (family) agrees to implement the following specific plan:    Topical medications:  Night: Start Adapalene gel topically a pea-size amount 2-3 nights a week and then move up to daily use with a moisturizer after. Can use a layer of moisturizer and then the medication and another layer of moisturizer.                REMEMBER:  Always take your acne pills with lots of water!  A pill stuck in your throat can cause significant burning and irritation.   Drink a full glass of " water to ensure the pill gets into your stomach.  Avoid “popping” a pill right before bed, and stay upright for at least 1 hour after taking a pill.      ACNE:  WHAT ZIT ALL ABOUT?    WHY DO I HAVE ACNE/PIMPLES?  Your skin is made of layers.  To keep the skin from becoming dry and cracked, the skin needs oil. The oil is made in little wells in the deeper layers in the skin.  People with acne have glands that make more oil and are more easily plugged, causing the glands to swell. Hormones, bacteria and your inherited tendency to have acne all play a role.    The medical term for “pimples” is acne or acne vulgaris (vulgaris means “common”). Most people get some acne. Acne does not come from being dirty.  Instead, it is an expected consequence of changes that occur during normal growth and development. Hormones, bacteria, and your family's tendency to have acne may all play a role.     “Whiteheads” or “blackheads” are openings of the glands (glands are the oil factories) onto the surface of the skin. “Blackheads” are not caused by dirt blocking the pores; instead, they result from the oxidation reaction of oil and skin in the pores with the air (like a “rust” reaction).        WHAT ABOUT STRESS?  Stress does not “cause” acne but it can make it worse. Make sure you get enough sleep and daily exercise!     WHAT ABOUT FOODS/DIET?  Try to eat a balanced, healthy diet. Some people feel that certain foods worsen their acne. While there aren't many studies available on this question, severe dietary changes are unlikely to help your acne and may be harmful to the health of your skin. If you find that a certain food seems to aggravate your acne, you may consider avoiding that food. Discuss this with your physician!    WHAT CAUSES MY ACNE?  There are four contributors to acne--the body's natural oil (sebum), clogged pores, bacteria (with the scientific name Propionibacterium acnes, or P. acnes, for short), and the body's  reaction to the bacteria living in the clogged pores (which causes inflammation). Here's what happens:    Sebum is produced in the normal oil-making glands in the deeper layers of the skin and reaches the surface through the skin's pores. An increase in certain hormones occurs around the time of puberty, and these hormones trigger the oil glands to produce increased amounts of sebum.  Pores with excess oil tend to become clogged more easily.  At the same time, P. acnes--one of the many types of bacteria that normally live on everyone's skin--thrives in the excess oil and causes a skin reaction (inflammation).  If a pore is clogged close to the surface, there is little inflammation. However, this results in the formation of “whiteheads” (closed comedones) or “blackheads” (open comedones) at the surface of the skin.  A plug that extends to, or forms a little deeper in the pore, or one that enlarges or ruptures may cause more inflammation. The result is red bumps (papules) and pus-filled pimples (pustules).  If plugging happens in the deepest skin layer, the inflammation may be even more severe, resulting in the formation of nodules or cysts. When these types of acne heal, they may leave behind discolored areas or true scars.    SKIN HYGIENE:  HOW SHOULD I WASH MY SKIN?  Acne does not come from being dirty, however, washing your face is part of taking good care of your skin and will help keep your face clear.  Good skin hygiene is, therefore, critical to support any acne treatment plan.  Here are several specific suggestions for practicing good skin hygiene and keeping your skin looking its best:    You should wash acne-prone skin TWICE A DAY: Once in the morning and once in the evening. This does include any showers you take that day, so do not overdo it!  Do not scrub the skin with a washcloth or loofah as these can irritate and inflame your acne. Acne does not come from “dirt”, so it is not necessary to scrub the  skin clean. In fact, scrubbing may lead to dryness and irritation that makes the acne even worse and harder for patients to tolerate acne medications.   Use a gentle facial moisturizing cleanser (Cetaphil Moisturizing Cleanser or Dove Fragrance-Free bar).  Avoid using soaps like Ivory, Dial, Pashto Spring, Lever, or soft/liquid soaps as these products will dry your skin.  Do not use any over-the-counter “acne washes” without your doctor's specific instruction to do so.  These products often contain salicylic acid or benzoyl peroxide. These ingredients can be helpful in clearing oil from the skin and reducing bacteria, but they may also be drying and can add to irritation.   Do not use exfoliating products with microbeads or brushes as these can cause irritation to the skin which can worsen the acne  Facials and other treatments to remove, squeeze, or “clean out” pores, if done by a , can help the acne.  They should not be done more than every 8 weeks  Try not to “pop pimples” or pick at your acne as this can delay healing and may result in scarring or skin color changes (“dark spots”) that are often more noticeable than the acne itself. Picking/popping acne can also cause a serious skin infection.  Wash or change your pillow case once to twice a week, especially if you use products in your hair.   Wash the skin as soon as possible after playing sports or other activities that cause a lot of sweating. Also, pay attention to how your sports equipment (shoulder pads, helmet strap, etc.) might be making your acne worse.  When you use makeup, moisturizer, or sunscreen make sure that these products are labeled “non-comedogenic,” or “won't clog pores,” or “won't cause acne.”     Scribe Attestation    I,:  Maxine Brewster MA am acting as a scribe while in the presence of the attending physician.:       I,:  Hernando Draper DO personally performed the services described in this documentation    as scribed  in my presence.:

## 2025-05-12 NOTE — PATIENT INSTRUCTIONS
EPIDERMAL INCLUSION CYST    Assessment and Plan:  Based on a thorough discussion of this condition and the management approach to it (including a comprehensive discussion of the known risks, side effects and potential benefits of treatment), the patient (family) agrees to implement the following specific plan:  Discussed removal if wanted    What are epidermal inclusion cysts?  Epidermal inclusion cysts are the most common, benign cutaneous cysts. There are many different names for epidermal inclusion cysts, including epidermoid cyst, epidermal cyst, infundibular cyst, inclusion cyst, and keratin cyst. These cysts can occur anywhere on the body and typically present as nodules directly underneath the skin. There is often a visible pore or opening in the center. The cysts are freely moveable and can range from a few millimeters to several centimeters in diameter. The center of epidermoid cysts almost always contains keratin, which has a cheesy appearance, and not sebum. They also do not originate from sebaceous glands. Therefore, epidermal inclusion cysts are not the same as sebaceous cysts.    Cysts may remain stable or progressively enlarge over time. There are no reliable predictive factors to tell if an epidermal inclusion cyst will enlarge, become inflamed, or remain quiescent. Infected cysts tend to become larger, turn red, and are more noticeable to the patient. There may be accompanying pain and discomfort.     What causes epidermal inclusion cysts?  Epidermal inclusion cysts often appear out of the blue and are not contagious. They are due to a proliferation of epidermal cells within the dermis and are more common in men than women. They occur more frequently in patients in their 20s to 40s. Epidermal inclusion cysts by themselves are usually not inherited, but they can be hereditary in rare syndromes such as Franz syndrome, nodular elastosis with cysts and comedones (Favre-Racouchot syndrome), and basal  cell nevus syndrome (Gorlin syndrome). Elderly patients with chronic sun-damaged skin areas have a higher likelihood of developing epidermoid cysts. They often occur in areas where hair follicles have been inflamed or repeatedly irritated are more frequent in patients with acne vulgaris. In the  period, they are called milia.     Patients on BRAF inhibitors such as imiquimod and cyclosporine have a higher incidence of epidermoid cysts of the face.    How do we diagnose an epidermal inclusion cyst?  Epidermoid inclusion cysts are often diagnosed by history and physical exam. There is usually no need for biopsy prior to removal.  Radiographic and laboratory exams, such as ultrasound studies, are unnecessary and not typically ordered unless the practitioner suspects a genetic condition.    What is the treatment for an epidermal inclusion cyst?  Inflamed, uninfected epidermal inclusion cysts rarely resolve spontaneously without therapy or surgical intervention. Treatment is not emergent unless desired by the patient.     Definitive treatment is via surgical excision with walls intact. This method will prevent recurrence. This is best done when the cyst is not inflamed, to decrease the probability of rupture during surgery.   A local anesthetic will be injected around the cyst  A small incision is made in the skin overlying the cyst, and contents are expressed  The incision is repaired with sutures    Another option is to use a 4mm punch biopsy with cyst extraction through the defect.    Incision and drainage is often needed if the cyst is infected or inflamed. If there is surrounding cellulitis, oral antibiotic therapy may be necessary. The common agents used target methicillin sensitive Staphylococcal aureus and methicillin resistant S aureus in areas of high prevalence.     ANDROGENETIC ALOPECIA    MALE PATIENT Assessment and Plan:  History and physical consistent with androgenetic alopecia  Discussed  treatment ladder, including PO and topical minoxidil, PRP,  low level laser therapy, supplements (saw palmetto - discussed same side effects as finasteride), finasteride and dutasteride, hair transplant  - Discussed 5% minoxidil foam qHS. Counseled patient that they may see some increased shedding within the first few weeks of treatment, which is a normal side effect and will resolve spontaneously. They may also see mild facial hypertrichosis. Educated that treatment should be continuous to maintain efficacy  - Discussed PO finasteride 1 mg daily - discussed that persistent sexual dysfunction has been seen in ~1% of men who take finasteride for hair loss. Also counseled patient that finasteride can lead to a falsely low PSA reading, and small potential increased risk of high grade prostate malignancy. Discussed that combination of topical minoxidil and oral finasteride is considered the best combination treatment for male androgenetic alopecia.   - Discussed  topical finasteride/minoxidil. Educated that topical formulations of finasteride are also available and have been shown to be as effective as 1 mg PO finasteride daily in the literature (PMID: 87934673), with decreased likelihood of side effects and possible synergistic effects when used in combination with topical minoxidil. Discussed [facility] compounded medication with Finasteride/Minoxidil for $35.  After discussion, patient is not interested in using finasteride at this time  *Discussed PO minoxidil 2.5 mg QOD which has been shown to improve hair growth at low dose. Educated on side effects, including hypertrichosis, hypotension, lower extremity edema.  Discussed additional options of light laser therapy; recommended hairmax brand laser band (can be purchased for about $800), which is thought to increase delivery of nutrients and restore the natural hair growth cycle, without notable side effects. Educated that studies were largely in patients  "who had active hair loss within the past 12 mos. Further discussed PRP therapy, educating that a recent study demonstrated that approximately 71% of patients with androgenetic alopecia respond to PRP treatments and that treatment consists of monthly PRP injections for 6 months followed by maintenance treatments every 3-6 months.   Educated patient that regrowth may take many months.      - Patient to start over-the-counter Rogaine (5% minoxidil) once in the morning and once at night  - Will see patient back in 6 mos to assess progress    ACNE VULGARIS (\"COMMON ACNE\")    Treatment plan:Based on a thorough discussion of this condition and the management approach to it (including a comprehensive discussion of the known risks, side effects and potential benefits of treatment), the patient (family) agrees to implement the following specific plan:    Topical medications:  Night: Start Adapalene gel topically a pea-size amount 2-3 nights a week and then move up to daily use with a moisturizer after. Can use a layer of moisturizer and then the medication and another layer of moisturizer.                REMEMBER:  Always take your acne pills with lots of water!  A pill stuck in your throat can cause significant burning and irritation.   Drink a full glass of water to ensure the pill gets into your stomach.  Avoid “popping” a pill right before bed, and stay upright for at least 1 hour after taking a pill.      ACNE:  WHAT ZIT ALL ABOUT?    WHY DO I HAVE ACNE/PIMPLES?  Your skin is made of layers.  To keep the skin from becoming dry and cracked, the skin needs oil. The oil is made in little wells in the deeper layers in the skin.  People with acne have glands that make more oil and are more easily plugged, causing the glands to swell. Hormones, bacteria and your inherited tendency to have acne all play a role.    The medical term for “pimples” is acne or acne vulgaris (vulgaris means “common”). Most people get some acne. Acne " does not come from being dirty.  Instead, it is an expected consequence of changes that occur during normal growth and development. Hormones, bacteria, and your family's tendency to have acne may all play a role.     “Whiteheads” or “blackheads” are openings of the glands (glands are the oil factories) onto the surface of the skin. “Blackheads” are not caused by dirt blocking the pores; instead, they result from the oxidation reaction of oil and skin in the pores with the air (like a “rust” reaction).        WHAT ABOUT STRESS?  Stress does not “cause” acne but it can make it worse. Make sure you get enough sleep and daily exercise!     WHAT ABOUT FOODS/DIET?  Try to eat a balanced, healthy diet. Some people feel that certain foods worsen their acne. While there aren't many studies available on this question, severe dietary changes are unlikely to help your acne and may be harmful to the health of your skin. If you find that a certain food seems to aggravate your acne, you may consider avoiding that food. Discuss this with your physician!    WHAT CAUSES MY ACNE?  There are four contributors to acne--the body's natural oil (sebum), clogged pores, bacteria (with the scientific name Propionibacterium acnes, or P. acnes, for short), and the body's reaction to the bacteria living in the clogged pores (which causes inflammation). Here's what happens:    Sebum is produced in the normal oil-making glands in the deeper layers of the skin and reaches the surface through the skin's pores. An increase in certain hormones occurs around the time of puberty, and these hormones trigger the oil glands to produce increased amounts of sebum.  Pores with excess oil tend to become clogged more easily.  At the same time, P. acnes--one of the many types of bacteria that normally live on everyone's skin--thrives in the excess oil and causes a skin reaction (inflammation).  If a pore is clogged close to the surface, there is little  inflammation. However, this results in the formation of “whiteheads” (closed comedones) or “blackheads” (open comedones) at the surface of the skin.  A plug that extends to, or forms a little deeper in the pore, or one that enlarges or ruptures may cause more inflammation. The result is red bumps (papules) and pus-filled pimples (pustules).  If plugging happens in the deepest skin layer, the inflammation may be even more severe, resulting in the formation of nodules or cysts. When these types of acne heal, they may leave behind discolored areas or true scars.    SKIN HYGIENE:  HOW SHOULD I WASH MY SKIN?  Acne does not come from being dirty, however, washing your face is part of taking good care of your skin and will help keep your face clear.  Good skin hygiene is, therefore, critical to support any acne treatment plan.  Here are several specific suggestions for practicing good skin hygiene and keeping your skin looking its best:    You should wash acne-prone skin TWICE A DAY: Once in the morning and once in the evening. This does include any showers you take that day, so do not overdo it!  Do not scrub the skin with a washcloth or loofah as these can irritate and inflame your acne. Acne does not come from “dirt”, so it is not necessary to scrub the skin clean. In fact, scrubbing may lead to dryness and irritation that makes the acne even worse and harder for patients to tolerate acne medications.   Use a gentle facial moisturizing cleanser (Cetaphil Moisturizing Cleanser or Dove Fragrance-Free bar).  Avoid using soaps like Ivory, Dial, Arabic Spring, Lever, or soft/liquid soaps as these products will dry your skin.  Do not use any over-the-counter “acne washes” without your doctor's specific instruction to do so.  These products often contain salicylic acid or benzoyl peroxide. These ingredients can be helpful in clearing oil from the skin and reducing bacteria, but they may also be drying and can add to irritation.    Do not use exfoliating products with microbeads or brushes as these can cause irritation to the skin which can worsen the acne  Facials and other treatments to remove, squeeze, or “clean out” pores, if done by a , can help the acne.  They should not be done more than every 8 weeks  Try not to “pop pimples” or pick at your acne as this can delay healing and may result in scarring or skin color changes (“dark spots”) that are often more noticeable than the acne itself. Picking/popping acne can also cause a serious skin infection.  Wash or change your pillow case once to twice a week, especially if you use products in your hair.   Wash the skin as soon as possible after playing sports or other activities that cause a lot of sweating. Also, pay attention to how your sports equipment (shoulder pads, helmet strap, etc.) might be making your acne worse.  When you use makeup, moisturizer, or sunscreen make sure that these products are labeled “non-comedogenic,” or “won't clog pores,” or “won't cause acne.”

## 2025-05-13 RX ORDER — ADAPALENE AND BENZOYL PEROXIDE 3; 25 MG/G; MG/G
GEL TOPICAL
Qty: 60 G | Refills: 6 | Status: SHIPPED | OUTPATIENT
Start: 2025-05-13